# Patient Record
Sex: MALE | Race: WHITE | NOT HISPANIC OR LATINO | Employment: OTHER | URBAN - METROPOLITAN AREA
[De-identification: names, ages, dates, MRNs, and addresses within clinical notes are randomized per-mention and may not be internally consistent; named-entity substitution may affect disease eponyms.]

---

## 2023-12-26 ENCOUNTER — TRANSFERRED RECORDS (OUTPATIENT)
Dept: HEALTH INFORMATION MANAGEMENT | Facility: CLINIC | Age: 58
End: 2023-12-26

## 2024-01-25 ENCOUNTER — TRANSFERRED RECORDS (OUTPATIENT)
Dept: HEALTH INFORMATION MANAGEMENT | Facility: CLINIC | Age: 59
End: 2024-01-25
Payer: COMMERCIAL

## 2024-02-09 ENCOUNTER — OFFICE VISIT (OUTPATIENT)
Dept: ORTHOPEDICS | Facility: CLINIC | Age: 59
End: 2024-02-09
Payer: COMMERCIAL

## 2024-02-09 DIAGNOSIS — M17.11 LOCALIZED OSTEOARTHRITIS OF RIGHT KNEE: Primary | ICD-10-CM

## 2024-02-09 DIAGNOSIS — S83.206S TEAR OF MENISCUS OF RIGHT KNEE AS CURRENT INJURY, UNSPECIFIED MENISCUS, UNSPECIFIED TEAR TYPE, SEQUELA: ICD-10-CM

## 2024-02-09 DIAGNOSIS — M25.569 KNEE PAIN: Primary | ICD-10-CM

## 2024-02-09 PROCEDURE — 99203 OFFICE O/P NEW LOW 30 MIN: CPT | Performed by: STUDENT IN AN ORGANIZED HEALTH CARE EDUCATION/TRAINING PROGRAM
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
## 2024-02-12 NOTE — TELEPHONE ENCOUNTER
Action February 13, 2024 3:21 PM ABT   Action Taken Image reports from [facility] received and sent to HIM for upload. Images from [facility] received and resolved to PACS     DIAGNOSIS: Knee Pain    APPOINTMENT DATE: 2/14/2024    Action    Action Taken 2/12/2024 3:50pm ANNALISA     I called [facility] Ph: [phone] #4- the phone continued to ring and ring... I faxed over a STAT request for scans.       NOTES STATUS DETAILS   OFFICE NOTE from referring provider Internal   Tahir Mtz,       OFFICE NOTE from other specialist Internal    LABS     CBC/DIFF Care Everywhere    MRI PACS 01/25/24: MR Knee   XRAYS (IMAGES & REPORTS) PACS 12/26/23: XR Knee

## 2024-02-14 ENCOUNTER — OFFICE VISIT (OUTPATIENT)
Dept: ORTHOPEDICS | Facility: CLINIC | Age: 59
End: 2024-02-14
Payer: COMMERCIAL

## 2024-02-14 ENCOUNTER — PRE VISIT (OUTPATIENT)
Dept: ORTHOPEDICS | Facility: CLINIC | Age: 59
End: 2024-02-14
Payer: COMMERCIAL

## 2024-02-14 VITALS — WEIGHT: 180 LBS | HEIGHT: 71 IN | BODY MASS INDEX: 25.2 KG/M2

## 2024-02-14 DIAGNOSIS — M25.562 CHRONIC PAIN OF LEFT KNEE: ICD-10-CM

## 2024-02-14 DIAGNOSIS — G89.29 CHRONIC PAIN OF LEFT KNEE: ICD-10-CM

## 2024-02-14 DIAGNOSIS — M25.561 CHRONIC PAIN OF RIGHT KNEE: Primary | ICD-10-CM

## 2024-02-14 DIAGNOSIS — G89.29 CHRONIC PAIN OF RIGHT KNEE: Primary | ICD-10-CM

## 2024-02-14 PROCEDURE — 99204 OFFICE O/P NEW MOD 45 MIN: CPT | Mod: GC | Performed by: ORTHOPAEDIC SURGERY

## 2024-02-14 NOTE — PROGRESS NOTES
Patient seen and examined with the fellow. I also personally reviewed the images and interpreted the imaging myself.     Assesment: Right knee medial meniscus tear, possible medial meniscus root tear    Right knee patellofemoral chondrosis    Left knee medial meniscus tear    Left knee patellofemoral chondrosis    Plan: I long discussion with the patient.  Reviewed the diagnosis and treatment options.  Very complex situation without any definite route that we need to proceed in.  I think there is enough going on in his right knee that consideration of surgical intervention be very reasonable given his meniscus tear.  I do recognize the new event that he had last week which does seem to be an acute on chronic worsening and may represent a meniscus root tear.  In light of this information I offered him the following course of action: Examination under anesthesia right knee, right knee arthroscopy, meniscectomy versus meniscus root repair, chondroplasty.  I discussed with him the pros cons risk and benefits of surgery.  We discussed the expected course of recovery.  I specifically discussed the divergent recovery pathway between a meniscectomy and a meniscus root repair.  He is good to international trips coming up and he is good to do these first.  We may consider surgery when he comes back.  We also discussed the possibility of things such as corticosteroid injection management oral anti-inflammatories.  At this time he is recognizing these are probably more of a temporary solution he is looking for something more definitive.  Though I do recognize that he has some arthritis in his knee overall I think his articular cartilage surfaces are well-preserved and he would likely see good improvement with arthroscopic intervention.    I discussed with the patient the risks, benefits, complications and techniques of surgery as well as the natural history of meniscus tears, meniscus root tears and the alternative treatment  options.    The risks include, but are not limited to the risk of death and risk of a myocardial infarction, risk of bleeding and a risk of infection, risk of nerve damage and a risk of muscle damage, stiffness, instability, continued pain or worsening pain and subsequent arthritis, need for future surgery, failure to improve.    The patient was provided an opportunity to ask questions and these were answered.      I agree with history, physical and imaging as well as the assessment and plan as detailed by Dr. Moreno.

## 2024-02-14 NOTE — LETTER
2/14/2024         RE: Jesus Arambula  [address] [place] [address]        Dear Colleague,    Thank you for referring your patient, Jesus Arambula, to the [facility] [place]. Please see a copy of my visit note below.    CHIEF CONCERN: Bilateral knee pain (R>L)    HISTORY:   This is a very pleasant 58-year-old male presenting with multiple months of bilateral knee pain with the right being more severe than the left.  Over the past few months he did not have any acute injury but had gradual onset of bilateral knee pain.  He is very active and does CrossFit.  He states that with prolonged walking he has the most amount of discomfort.  He states that this past Thursday he was doing CrossFit when he felt a pop in his right knee and since then has had more discomfort.  He also feels like he has some loss of range of motion.  He denies previous surgery or injury to the knee.  He is here today to discuss diagnosis and treatment options    PAST MEDICAL HISTORY: (Reviewed with the patient and in the [facility] medical record)  None    PAST SURGICAL HISTORY: (Reviewed with the patient and in the [facility] medical record)  Left proximal hamstring repair    MEDICATIONS: (Reviewed with the patient and in the [facility] medical record)    Notable medications include: No blood thinners    ALLERGIES: (Reviewed with the patient and in the [facility] medical record)  None      SOCIAL HISTORY: (Reviewed with the patient and in the medical record)  --Tobacco: No  --Occupation: Self-employed and works in home for [employer]  --Avocation/Sport: Skiing and CrossFit    FAMILY HISTORY: (Reviewed with the patient and in the medical record)  -- No family history of bleeding, clotting, or difficulty with anesthesia    REVIEW OF SYSTEMS: (Reviewed with the patient and on the health intake form)  -- A comprehensive 10 point review of systems was conducted and is negative except as noted in the HPI    EXAM:     General:  Awake, Alert and Oriented, No acute Distress. Articulate and Interactive    Body mass index is 25.1 kg/m .    Partial medial meniscectomy possible    Right lower extremity :  Skin is Warm and Well perfused, no suggestion of infection  Lachman 0  Mild tenderness to palpation on the medial joint line especially posterior medial  Trace effusion  130 degrees of flexion and extension to 0  Positive Jacqueline's for very mild pain medially  Stable to varus and stress  EHL/FHL/TA/GS 5/5  Sensation intact L3-S1  2+ Dorsalis Pedis Pulse      Left Lower extremity :  Skin is Warm and Well perfused, no suggestion of infection  Minimal TTP along bilateral joint lines  Lachman 0  Negative Jacqueline's  Stable varus valgus stress  140 of flexion and extension is 0  EHL/FHL/TA/GS 5/5  Sensation intact L3-S1  2+ Dorsalis Pedis Pulse      IMAGING:    Radiographs of the bilateral knee from 12/26/2023 were independently reviewed by me and findings were discussed with the patient today. The imaging demonstrates mild osteoarthritic changes in the tibifemoral joint and mild to moderate OA changes in patellofemoral joint.    MRI of the Bilateral knee from 1/25/2024 were independently reviewed by me and findings were discussed with the patient today. The imaging demonstrates degenerative tearing of the medial meniscus in the left knee.  Right knee there is a complex tear of the posterior horn of the medial meniscus as well as some increased signal in the posterior root of the medial meniscus.    ASSESSMENT:  58-year-old male with chronic bilateral knee pain secondary to medial meniscal pathology with concern for acute right medial meniscus root tear    PLAN:  We had a long discussion with the patient concerning his symptoms and exam and imaging finding.  He understands that there are multiple etiologies for his pain but that his persistent symptoms as well as finding on imaging do make surgery a reasonable option.  This would be in the form of  right knee examination under anesthesia, arthroscopy, partial medial meniscectomy, and possible medial meniscal root repair.  The risks and benefits as well as alternatives to operative intervention were discussed with the patient once all questions were answered he decided he would like to proceed with surgical intervention.  Will plan on getting him scheduled for surgery.    Toan Moreno MD   Sports Fellow         Patient seen and examined with the fellow. I also personally reviewed the images and interpreted the imaging myself.     Assesment: Right knee medial meniscus tear, possible medial meniscus root tear    Right knee patellofemoral chondrosis    Left knee medial meniscus tear    Left knee patellofemoral chondrosis    Plan: I long discussion with the patient.  Reviewed the diagnosis and treatment options.  Very complex situation without any definite route that we need to proceed in.  I think there is enough going on in his right knee that consideration of surgical intervention be very reasonable given his meniscus tear.  I do recognize the new event that he had last week which does seem to be an acute on chronic worsening and may represent a meniscus root tear.  In light of this information I offered him the following course of action: Examination under anesthesia right knee, right knee arthroscopy, meniscectomy versus meniscus root repair, chondroplasty.  I discussed with him the pros cons risk and benefits of surgery.  We discussed the expected course of recovery.  I specifically discussed the divergent recovery pathway between a meniscectomy and a meniscus root repair.  He is good to international trips coming up and he is good to do these first.  We may consider surgery when he comes back.  We also discussed the possibility of things such as corticosteroid injection management oral anti-inflammatories.  At this time he is recognizing these are probably more of a temporary solution he is looking for  something more definitive.  Though I do recognize that he has some arthritis in his knee overall I think his articular cartilage surfaces are well-preserved and he would likely see good improvement with arthroscopic intervention.    I discussed with the patient the risks, benefits, complications and techniques of surgery as well as the natural history of meniscus tears, meniscus root tears and the alternative treatment options.    The risks include, but are not limited to the risk of death and risk of a myocardial infarction, risk of bleeding and a risk of infection, risk of nerve damage and a risk of muscle damage, stiffness, instability, continued pain or worsening pain and subsequent arthritis, need for future surgery, failure to improve.    The patient was provided an opportunity to ask questions and these were answered.      I agree with history, physical and imaging as well as the assessment and plan as detailed by Dr. Moreno.       Again, thank you for allowing me to participate in the care of your patient.        Sincerely,        Les Beaver MD

## 2024-02-15 ENCOUNTER — TELEPHONE (OUTPATIENT)
Dept: ORTHOPEDICS | Facility: CLINIC | Age: 59
End: 2024-02-15
Payer: COMMERCIAL

## 2024-02-15 PROBLEM — G89.29 CHRONIC PAIN OF RIGHT KNEE: Status: ACTIVE | Noted: 2024-02-14

## 2024-02-15 PROBLEM — M25.561 CHRONIC PAIN OF RIGHT KNEE: Status: ACTIVE | Noted: 2024-02-14

## 2024-02-15 NOTE — TELEPHONE ENCOUNTER
Patient is scheduled for surgery with Dr. Beaver    Spoke with: Patient    Date of Surgery: 3/19/24    Location: [facility]    Post ops: 1 & 6 weeks    Pre op with Provider: Complete    H&P: Patient will call PCP to schedule    Additional imaging/appointments: N/A    Surgery packet: Received in clinic     Additional comments: N/A        Yaritza Renee MA on 2/15/2024 at 1:12 PM

## 2024-02-15 NOTE — NURSING NOTE
Teaching Flowsheet   Relevant Diagnosis: Right knee arthroscopy, meniscectomy and possible meniscus root repair, chondroplasty  Teaching Topic: Pre operative care     Person(s) involved in teaching:   Patient     Motivation Level:  Asks Questions: Yes  Eager to Learn: Yes  Cooperative: Yes  Receptive (willing/able to accept information): Yes  Any cultural factors/Christian beliefs that may influence understanding or compliance? No       Patient demonstrates understanding of the following:  Reason for the appointment, diagnosis and treatment plan: Yes  Knowledge of proper use of medications and conditions for which they are ordered (with special attention to potential side effects or drug interactions): Yes  Which situations necessitate calling provider and whom to contact: Yes       Teaching Concerns Addressed: Pre operative care       Proper use and care of brace and crutches (medical equip, care aids, etc.): No, explain: will be provided after surgery  Nutritional needs and diet plan: Yes  Pain management techniques: Yes  Wound Care: Yes  How and/when to access community resources: Yes     Instructional Materials Used/Given: Pre operative instructions and surgical soap     Time spent with patient: 15 minutes.    **Patient is ready to schedule surgery at the [facility] he would prefer mid-late March following a few upcoming trips that he has planned. The patient plans to do his pre operative physical with his PCP Dr. Quiles at [facility]. He would like to do his post op PT with Krysten Bond. **    
Statement Selected

## 2025-01-25 ENCOUNTER — HEALTH MAINTENANCE LETTER (OUTPATIENT)
Age: 60
End: 2025-01-25